# Patient Record
Sex: FEMALE | Race: WHITE | NOT HISPANIC OR LATINO | URBAN - METROPOLITAN AREA
[De-identification: names, ages, dates, MRNs, and addresses within clinical notes are randomized per-mention and may not be internally consistent; named-entity substitution may affect disease eponyms.]

---

## 2020-08-23 ENCOUNTER — EMERGENCY (EMERGENCY)
Facility: HOSPITAL | Age: 85
LOS: 1 days | Discharge: HOME | End: 2020-08-23
Attending: EMERGENCY MEDICINE
Payer: MEDICARE

## 2020-08-23 VITALS
OXYGEN SATURATION: 98 % | RESPIRATION RATE: 17 BRPM | HEART RATE: 98 BPM | TEMPERATURE: 98 F | SYSTOLIC BLOOD PRESSURE: 123 MMHG | DIASTOLIC BLOOD PRESSURE: 76 MMHG

## 2020-08-23 VITALS
DIASTOLIC BLOOD PRESSURE: 65 MMHG | OXYGEN SATURATION: 96 % | SYSTOLIC BLOOD PRESSURE: 101 MMHG | HEART RATE: 108 BPM | RESPIRATION RATE: 18 BRPM | TEMPERATURE: 99 F

## 2020-08-23 PROCEDURE — 70450 CT HEAD/BRAIN W/O DYE: CPT | Mod: 26

## 2020-08-23 PROCEDURE — 73590 X-RAY EXAM OF LOWER LEG: CPT | Mod: 26,RT

## 2020-08-23 PROCEDURE — 72125 CT NECK SPINE W/O DYE: CPT

## 2020-08-23 PROCEDURE — 99284 EMERGENCY DEPT VISIT MOD MDM: CPT

## 2020-08-23 PROCEDURE — 72170 X-RAY EXAM OF PELVIS: CPT | Mod: 26

## 2020-08-23 PROCEDURE — 99284 EMERGENCY DEPT VISIT MOD MDM: CPT | Mod: 25

## 2020-08-23 PROCEDURE — 72170 X-RAY EXAM OF PELVIS: CPT

## 2020-08-23 PROCEDURE — 70450 CT HEAD/BRAIN W/O DYE: CPT

## 2020-08-23 PROCEDURE — 72125 CT NECK SPINE W/O DYE: CPT | Mod: 26

## 2020-08-23 PROCEDURE — 93005 ELECTROCARDIOGRAM TRACING: CPT

## 2020-08-23 PROCEDURE — 73590 X-RAY EXAM OF LOWER LEG: CPT

## 2020-08-23 NOTE — ED ADULT TRIAGE NOTE - CHIEF COMPLAINT QUOTE
BIBA for s/p fall yesterday at [facility] rehab. She fell out her w/c at 1pm, hematoma n ecchymosis back of her head, no loc or N/V. Right leg cast from previous fx. Pt sent for head CT scan

## 2020-08-23 NOTE — ED ADULT NURSE NOTE - INTERVENTIONS DEFINITIONS
Provide visual clues: red socks/Call bell, personal items and telephone within reach/Stretcher in lowest position, wheels locked, appropriate side rails in place

## 2020-08-23 NOTE — ED PROVIDER NOTE - PROGRESS NOTE DETAILS
Griffin: [facility]  [facility] with contusion.  tylenol for pain. fall precaution. return to [facility] home

## 2020-08-23 NOTE — ED PROVIDER NOTE - OBJECTIVE STATEMENT
83 y/o F with a significant PMHx of HTN, HLD, chronic atrial fibrillation, on Eliquis, R ankle tibial injury with a posterior sugar tong splint in place, presents to the ED s/p fall yesterday per nursing home papers. Patient was in a wheelchair, lost balance, fell and hit the back of her head; no LOC. Patient reports no significant complaints and cannot tell me the exact details of the incident. Patient feels otherwise at baseline. Denies pain at the extremities or any other acute complaints.

## 2020-08-23 NOTE — ED PROVIDER NOTE - PATIENT PORTAL LINK FT
You can access the FollowMyHealth Patient Portal offered by [facility] by registering at the following website: [email or web address]. By joining [employer]’s FollowMyHealth portal, you will also be able to view your health information using other applications (apps) compatible with our system.

## 2020-08-23 NOTE — ED PROVIDER NOTE - CLINICAL SUMMARY MEDICAL DECISION MAKING FREE TEXT BOX
85 y/o F presents s/p mechanical fall. Will obtain head CT since patient is on Eliquis and obtain X-ray of extremity to make sure of no change in alignment. If imaging is negative, patient can be discharged back to assisted living facility.

## 2020-08-23 NOTE — ED ADULT NURSE NOTE - OBJECTIVE STATEMENT
BIB EMS from [facility] for post fall, Patient  unable recall incident noted with bruising and lump to Lt occipital, multiple bruising to B/L arms, deformity and discoloration to L foot/ankle, splint to Rt shin, Patient denies LOC reports frequent fall.

## 2020-08-23 NOTE — ED ADULT NURSE NOTE - CHIEF COMPLAINT QUOTE
BIBA for s/p fall yesterday at [facility] rehab. She fell out her w/c at 1pm, hematoma n ecchymosis back of her head, no loc or N/V. Right leg cast from previous fx. Pt sent for head CT scan
